# Patient Record
Sex: MALE | Race: WHITE | NOT HISPANIC OR LATINO | URBAN - METROPOLITAN AREA
[De-identification: names, ages, dates, MRNs, and addresses within clinical notes are randomized per-mention and may not be internally consistent; named-entity substitution may affect disease eponyms.]

---

## 2017-08-21 ENCOUNTER — IMPORTED ENCOUNTER (OUTPATIENT)
Dept: URBAN - METROPOLITAN AREA CLINIC 50 | Facility: CLINIC | Age: 57
End: 2017-08-21

## 2017-08-25 ENCOUNTER — IMPORTED ENCOUNTER (OUTPATIENT)
Dept: URBAN - METROPOLITAN AREA CLINIC 50 | Facility: CLINIC | Age: 57
End: 2017-08-25

## 2017-08-30 ENCOUNTER — IMPORTED ENCOUNTER (OUTPATIENT)
Dept: URBAN - METROPOLITAN AREA CLINIC 50 | Facility: CLINIC | Age: 57
End: 2017-08-30

## 2017-09-08 ENCOUNTER — IMPORTED ENCOUNTER (OUTPATIENT)
Dept: URBAN - METROPOLITAN AREA CLINIC 50 | Facility: CLINIC | Age: 57
End: 2017-09-08

## 2017-09-29 ENCOUNTER — IMPORTED ENCOUNTER (OUTPATIENT)
Dept: URBAN - METROPOLITAN AREA CLINIC 50 | Facility: CLINIC | Age: 57
End: 2017-09-29

## 2017-11-22 ENCOUNTER — IMPORTED ENCOUNTER (OUTPATIENT)
Dept: URBAN - METROPOLITAN AREA CLINIC 50 | Facility: CLINIC | Age: 57
End: 2017-11-22

## 2017-12-06 ENCOUNTER — IMPORTED ENCOUNTER (OUTPATIENT)
Dept: URBAN - METROPOLITAN AREA CLINIC 50 | Facility: CLINIC | Age: 57
End: 2017-12-06

## 2018-01-11 NOTE — PROCEDURE NOTE: SURGICAL
Prior to commencing surgery patient identification, surgical procedure, site, and side were confirmed by Dr. Melo Moy. Following topical proparacaine anesthesia, the patient was positioned at the YAG laser, a contact lens coupled to the cornea with methylcellulose and an axial posterior capsulotomy performed without complication using 3.3 Mj x 25. Excess methylcellulose was washed from the eye, one drop of Alphagan was instilled and the patient returned to the holding area having tolerated the procedure well and without complication. <br />VOO:[id number]

## 2018-01-18 NOTE — PROCEDURE NOTE: SURGICAL
Prior to commencing surgery patient identification, surgical procedure, site, and side were confirmed by Dr. Fabio Lynne. Following topical proparacaine anesthesia, the patient was positioned at the YAG laser, a contact lens coupled to the cornea with methylcellulose and an axial posterior capsulotomy performed without complication using 3.1 Mj x 3.1. Excess methylcellulose was washed from the eye, one drop of Alphagan was instilled and the patient returned to the holding area having tolerated the procedure well and without complication. <br />[facility]:[id number]

## 2019-03-15 ENCOUNTER — IMPORTED ENCOUNTER (OUTPATIENT)
Dept: URBAN - METROPOLITAN AREA CLINIC 50 | Facility: CLINIC | Age: 59
End: 2019-03-15

## 2019-03-18 ENCOUNTER — IMPORTED ENCOUNTER (OUTPATIENT)
Dept: URBAN - METROPOLITAN AREA CLINIC 50 | Facility: CLINIC | Age: 59
End: 2019-03-18

## 2019-04-11 ENCOUNTER — IMPORTED ENCOUNTER (OUTPATIENT)
Dept: URBAN - METROPOLITAN AREA CLINIC 50 | Facility: CLINIC | Age: 59
End: 2019-04-11

## 2019-04-11 NOTE — PATIENT DISCUSSION
"""Discussed. Recommend excision of lesion NIURKA. Patient wishes to schedule.  Discussed he stop all ""

## 2019-04-11 NOTE — PATIENT DISCUSSION
"""Discussed. Better.  Recommend he restart Lotemax OU qhs. Keep appointment with Dr. Aaron Booker ""

## 2019-04-19 ENCOUNTER — IMPORTED ENCOUNTER (OUTPATIENT)
Dept: URBAN - METROPOLITAN AREA CLINIC 50 | Facility: CLINIC | Age: 59
End: 2019-04-19

## 2019-05-17 ENCOUNTER — IMPORTED ENCOUNTER (OUTPATIENT)
Dept: URBAN - METROPOLITAN AREA CLINIC 50 | Facility: CLINIC | Age: 59
End: 2019-05-17

## 2019-05-17 NOTE — PATIENT DISCUSSION
"""Patient lest wearing trial #1 OU. Patient also took a -4.25 +2.50D add lens home for OS to try. Patient will call progress. Ok to finalize if patient likes.  Trials today

## 2019-05-17 NOTE — PATIENT DISCUSSION
"""Completely resolved. Ok to restart CL use.  Patient educated that he is not to use Lotemax while ""

## 2019-08-12 ENCOUNTER — IMPORTED ENCOUNTER (OUTPATIENT)
Dept: URBAN - METROPOLITAN AREA CLINIC 50 | Facility: CLINIC | Age: 59
End: 2019-08-12

## 2021-04-17 ASSESSMENT — VISUAL ACUITY
OS_CC: J1+@ 14 IN
OD_CC: 20/20-2
OD_CC: 20/25-1
OS_CC: 20/20
OS_CC: 20/20-
OD_CC: 20/20
OS_CC: 20/20-1
OS_CC: J1+
OD_CC: J1+
OS_CC: 20/15-2
OD_CC: 20/20-1
OD_CC: J1+@ 14 IN
OD_CC: 20/20
OS_CC: 20/20
OD_CC: 20/20-
OS_CC: 20/20-
OD_CC: 20/20-
OS_CC: 20/30+1
OD_CC: 20/20
OS_CC: 20/20

## 2021-04-17 ASSESSMENT — TONOMETRY
OS_IOP_MMHG: 13
OD_IOP_MMHG: 16
OD_IOP_MMHG: 13
OS_IOP_MMHG: 16
OS_IOP_MMHG: 15
OD_IOP_MMHG: 16
OS_IOP_MMHG: 13
OS_IOP_MMHG: 16
OS_IOP_MMHG: 15
OD_IOP_MMHG: 14
OD_IOP_MMHG: 14
OS_IOP_MMHG: 11
OD_IOP_MMHG: 15
OD_IOP_MMHG: 15
OD_IOP_MMHG: 11
OS_IOP_MMHG: 18

## 2021-09-03 ENCOUNTER — PREPPED CHART (OUTPATIENT)
Dept: URBAN - METROPOLITAN AREA CLINIC 50 | Facility: CLINIC | Age: 61
End: 2021-09-03

## 2021-12-31 ENCOUNTER — COMPREHENSIVE EXAM (OUTPATIENT)
Dept: URBAN - METROPOLITAN AREA CLINIC 52 | Facility: CLINIC | Age: 61
End: 2021-12-31

## 2021-12-31 DIAGNOSIS — H02.834: ICD-10-CM

## 2021-12-31 DIAGNOSIS — H02.831: ICD-10-CM

## 2021-12-31 DIAGNOSIS — H43.812: ICD-10-CM

## 2021-12-31 DIAGNOSIS — H25.13: ICD-10-CM

## 2021-12-31 PROCEDURE — 92014 COMPRE OPH EXAM EST PT 1/>: CPT

## 2021-12-31 ASSESSMENT — VISUAL ACUITY
OD_CC: 20/25+2
OS_CC: 20/20-1
OS_GLARE: 20/20
OD_GLARE: 20/25
OU_SC: J1+

## 2021-12-31 ASSESSMENT — TONOMETRY
OS_IOP_MMHG: 14
OD_IOP_MMHG: 14

## 2022-02-07 ENCOUNTER — CONTACT LENSES/GLASSES VISIT (OUTPATIENT)
Dept: URBAN - METROPOLITAN AREA CLINIC 50 | Facility: CLINIC | Age: 62
End: 2022-02-07

## 2022-02-07 DIAGNOSIS — Z97.3: ICD-10-CM

## 2022-02-07 PROCEDURE — 92310-3E ESTABLISHED CL PATIENT MULTIFOCAL AND/OR MONOVISION SOFT LENS EVALUATION

## 2022-02-07 ASSESSMENT — VISUAL ACUITY
OU_CC: 20/15-1
OS_CC: 20/15-1
OD_CC: 20/20-1
OU_CC: J1+

## 2022-02-07 NOTE — PATIENT DISCUSSION
Patient here today for CL fitting. Worn CL in the past. Last fitting was 2019. Was in a Biofinity MF last. Did not have any multifocal bi weekly lenses in office today. Trialed patient in a Acuvue Oasys 2 week distance lens. Will order MF trials with High ADD for patient to . If this lens is not right for patient we can order the Softlens MF in a Xochitl 8.5 or C-Dax. Will order C-Dax first and then Softlens 2nd if patient does not like the Acuvue Oasys 2 week.

## 2022-02-07 NOTE — PATIENT DISCUSSION
Advised no swimming, sleeping, or showering in lenses. Do not over wear CL. Avoid tap water on cases. Replace CL solution daily. Do not top off old CL solution. If pain or irritation occur while wearing lenses, discontinue and call our office. I & R successful.

## 2022-02-14 ENCOUNTER — DIAGNOSTICS ONLY (OUTPATIENT)
Dept: URBAN - METROPOLITAN AREA CLINIC 50 | Facility: CLINIC | Age: 62
End: 2022-02-14

## 2022-02-14 DIAGNOSIS — H02.834: ICD-10-CM

## 2022-02-14 DIAGNOSIS — H02.831: ICD-10-CM

## 2022-02-14 PROCEDURE — 92082 INTERMEDIATE VISUAL FIELD XM: CPT

## 2022-02-14 PROCEDURE — 92285 EXTERNAL OCULAR PHOTOGRAPHY: CPT

## 2022-06-03 ENCOUNTER — PRE-OP/H&P (OUTPATIENT)
Dept: URBAN - METROPOLITAN AREA CLINIC 52 | Facility: CLINIC | Age: 62
End: 2022-06-03

## 2022-06-03 VITALS — HEIGHT: 60 IN | DIASTOLIC BLOOD PRESSURE: 71 MMHG | SYSTOLIC BLOOD PRESSURE: 116 MMHG | HEART RATE: 67 BPM

## 2022-06-03 DIAGNOSIS — H02.834: ICD-10-CM

## 2022-06-03 DIAGNOSIS — H02.831: ICD-10-CM

## 2022-06-03 PROCEDURE — 92285 EXTERNAL OCULAR PHOTOGRAPHY: CPT

## 2022-06-03 PROCEDURE — PREOP PRE OP VISIT

## 2022-06-03 ASSESSMENT — VISUAL ACUITY
OS_CC: 20/20-1
OD_CC: 20/20-1

## 2022-06-14 ENCOUNTER — SURGERY/PROCEDURE (OUTPATIENT)
Dept: URBAN - METROPOLITAN AREA SURGERY 16 | Facility: SURGERY | Age: 62
End: 2022-06-14

## 2022-06-14 DIAGNOSIS — H02.834: ICD-10-CM

## 2022-06-14 DIAGNOSIS — H02.831: ICD-10-CM

## 2022-06-14 PROCEDURE — 15823 50 BLEPHAROPLASTY, UPPER WITH EXTENSIVE SKIN WEIGHING DOWN LID (BILATERAL)

## 2022-06-14 NOTE — PROCEDURE NOTE: SURGICAL
"<p class=""MsoNormal"" style=""margin-bottom:0in;line-height:normal""><span style=""xrh-gpbva-tmdx-family:calibri;yqz-xrxkjyb-yumq-family:times new lissette;xne-lhcpj-oznp-family:calibri;drj-esns-pahf-family:calibri;color:black"">PREOPERATIVEDIAGNOSIS: Dermatochalasis

## 2022-06-24 ENCOUNTER — POST-OP (OUTPATIENT)
Dept: URBAN - METROPOLITAN AREA CLINIC 52 | Facility: CLINIC | Age: 62
End: 2022-06-24

## 2022-06-24 DIAGNOSIS — Z98.890: ICD-10-CM

## 2022-06-24 PROCEDURE — 99024 POSTOP FOLLOW-UP VISIT: CPT

## 2022-06-24 ASSESSMENT — VISUAL ACUITY
OD_CC: 20/20-2
OS_CC: 20/20-2

## 2022-07-15 ENCOUNTER — POST-OP (OUTPATIENT)
Dept: URBAN - METROPOLITAN AREA CLINIC 52 | Facility: CLINIC | Age: 62
End: 2022-07-15

## 2022-07-15 DIAGNOSIS — Z98.890: ICD-10-CM

## 2022-07-15 PROCEDURE — 99024 POSTOP FOLLOW-UP VISIT: CPT

## 2022-07-15 PROCEDURE — 92285 EXTERNAL OCULAR PHOTOGRAPHY: CPT

## 2022-07-15 ASSESSMENT — VISUAL ACUITY
OS_CC: 20/20
OD_CC: 20/20-1

## 2022-12-16 ENCOUNTER — COMPREHENSIVE EXAM (OUTPATIENT)
Dept: URBAN - METROPOLITAN AREA CLINIC 52 | Facility: CLINIC | Age: 62
End: 2022-12-16

## 2022-12-16 PROCEDURE — 92014 COMPRE OPH EXAM EST PT 1/>: CPT

## 2022-12-16 PROCEDURE — 92015 DETERMINE REFRACTIVE STATE: CPT

## 2022-12-16 ASSESSMENT — VISUAL ACUITY
OD_CC: 20/25+2
OD_GLARE: 20/25
OS_GLARE: 20/20
OU_SC: J1
OU_CC: 20/20
OS_CC: 20/20

## 2022-12-16 ASSESSMENT — TONOMETRY
OS_IOP_MMHG: 12
OD_IOP_MMHG: 13

## 2022-12-29 ENCOUNTER — CONTACT LENSES/GLASSES VISIT (OUTPATIENT)
Dept: URBAN - METROPOLITAN AREA CLINIC 50 | Facility: CLINIC | Age: 62
End: 2022-12-29

## 2022-12-29 PROCEDURE — 92310-3E ESTABLISHED CL PATIENT MULTIFOCAL AND/OR MONOVISION SOFT LENS EVALUATION

## 2022-12-29 ASSESSMENT — VISUAL ACUITY
OD_CC: 20/25-2
OS_CC: 20/20
OU_CC: 20/20

## 2022-12-29 NOTE — PATIENT DISCUSSION
Patient has not worn contact lenses in many years and wanted to trial a monthly multifocal lens.  Insertion of lenses by patient went without incident and he is confident with removal.

## 2023-03-01 NOTE — PATIENT DISCUSSION
Retinal tear and detachment warning symptoms reviewed and patient instructed to call immediately if increasing floaters, flashes, or decreasing peripheral vision. no edema,  no murmurs,  regular rate and rhythm